# Patient Record
Sex: MALE | Race: WHITE | NOT HISPANIC OR LATINO | URBAN - METROPOLITAN AREA
[De-identification: names, ages, dates, MRNs, and addresses within clinical notes are randomized per-mention and may not be internally consistent; named-entity substitution may affect disease eponyms.]

---

## 2019-04-05 ENCOUNTER — EMERGENCY (EMERGENCY)
Facility: HOSPITAL | Age: 19
LOS: 1 days | Discharge: HOME | End: 2019-04-05
Admitting: EMERGENCY MEDICINE
Payer: COMMERCIAL

## 2019-04-05 VITALS
DIASTOLIC BLOOD PRESSURE: 51 MMHG | HEART RATE: 65 BPM | OXYGEN SATURATION: 100 % | TEMPERATURE: 98 F | SYSTOLIC BLOOD PRESSURE: 108 MMHG | RESPIRATION RATE: 16 BRPM

## 2019-04-05 PROCEDURE — 99283 EMERGENCY DEPT VISIT LOW MDM: CPT | Mod: 25

## 2019-04-05 RX ORDER — IBUPROFEN 200 MG
600 TABLET ORAL ONCE
Qty: 0 | Refills: 0 | Status: COMPLETED | OUTPATIENT
Start: 2019-04-05 | End: 2019-04-05

## 2019-04-05 RX ORDER — TRAMADOL HYDROCHLORIDE 50 MG/1
1 TABLET ORAL
Qty: 10 | Refills: 0 | OUTPATIENT
Start: 2019-04-05

## 2019-04-05 RX ORDER — TRAMADOL HYDROCHLORIDE 50 MG/1
50 TABLET ORAL ONCE
Qty: 0 | Refills: 0 | Status: DISCONTINUED | OUTPATIENT
Start: 2019-04-05 | End: 2019-04-05

## 2019-04-05 RX ORDER — IBUPROFEN 200 MG
1 TABLET ORAL
Qty: 20 | Refills: 0 | OUTPATIENT
Start: 2019-04-05

## 2019-04-05 RX ADMIN — TRAMADOL HYDROCHLORIDE 50 MILLIGRAM(S): 50 TABLET ORAL at 07:12

## 2019-04-05 RX ADMIN — Medication 600 MILLIGRAM(S): at 07:18

## 2019-04-05 RX ADMIN — TRAMADOL HYDROCHLORIDE 50 MILLIGRAM(S): 50 TABLET ORAL at 07:18

## 2019-04-05 RX ADMIN — Medication 600 MILLIGRAM(S): at 07:12

## 2019-04-05 NOTE — ED PROVIDER NOTE - NSFOLLOWUPINSTRUCTIONS_ED_ALL_ED_FT
Follow up with dental clinic provided on sheet.  Take antibiotic as prescribed.  take pain medication as needed.

## 2019-04-05 NOTE — ED PROVIDER NOTE - OBJECTIVE STATEMENT
17 y/o M c/o L upper tooth pain since last night.  He states the pain radiates along L lateral cheek and up towards the temple.  He reports recent cold with sinus congestion.  He denies fevers/chills, n/v, facial or dental trauma, gum swelling or drainage, history of recent dental issues.

## 2019-04-05 NOTE — ED PROVIDER NOTE - CLINICAL SUMMARY MEDICAL DECISION MAKING FREE TEXT BOX
19 y/o M presents to ED c/o L upper dental pain with concurrent URI.  Oral mucosa and teeth appear normal.  + dental pain to percussion.  DD: early dental infection vs sinus congestion with referred pain.  Will Rx Augmentin and Tramadol.  Pt referred to [facility] dental clinic.

## 2019-04-09 ENCOUNTER — EMERGENCY (EMERGENCY)
Facility: HOSPITAL | Age: 19
LOS: 1 days | Discharge: HOME | End: 2019-04-09
Attending: EMERGENCY MEDICINE | Admitting: EMERGENCY MEDICINE
Payer: COMMERCIAL

## 2019-04-09 VITALS
HEART RATE: 95 BPM | TEMPERATURE: 98 F | SYSTOLIC BLOOD PRESSURE: 127 MMHG | OXYGEN SATURATION: 96 % | DIASTOLIC BLOOD PRESSURE: 71 MMHG | RESPIRATION RATE: 18 BRPM

## 2019-04-09 DIAGNOSIS — K08.89 OTHER SPECIFIED DISORDERS OF TEETH AND SUPPORTING STRUCTURES: ICD-10-CM

## 2019-04-09 DIAGNOSIS — J34.89 OTHER SPECIFIED DISORDERS OF NOSE AND NASAL SINUSES: ICD-10-CM

## 2019-04-09 PROBLEM — Z78.9 OTHER SPECIFIED HEALTH STATUS: Chronic | Status: ACTIVE | Noted: 2019-04-05

## 2019-04-09 LAB — S PYO AG SPEC QL IA: NEGATIVE — SIGNIFICANT CHANGE UP

## 2019-04-09 PROCEDURE — 99283 EMERGENCY DEPT VISIT LOW MDM: CPT | Mod: 25

## 2019-04-09 RX ORDER — NYSTATIN 500MM UNIT
4 POWDER (EA) MISCELLANEOUS
Qty: 50 | Refills: 0 | OUTPATIENT
Start: 2019-04-09 | End: 2019-04-13

## 2019-04-09 NOTE — ED ADULT NURSE NOTE - NSIMPLEMENTINTERV_GEN_ALL_ED
Implemented All Universal Safety Interventions:  [place] to call system. Call bell, personal items and telephone within reach. Instruct patient to call for assistance. Room bathroom lighting operational. Non-slip footwear when patient is off stretcher. Physically safe environment: no spills, clutter or unnecessary equipment. Stretcher in lowest position, wheels locked, appropriate side rails in place.

## 2019-04-09 NOTE — ED PROVIDER NOTE - OBJECTIVE STATEMENT
Patient with no pmhx, was recently seen in ed for L upper molar pain, subsequently sent to [facility] dental where he was told he had no dental infection, presents today with diffuse tongue burning, and sensation of rash to tongue with dryness, and discomfort. notes some sore throat as well. denies cough, denies hx of tonsilar infections, denies hx of strep. denies rash. tolerating foods, denies trismus, or recent dental work. denies abx. went to [facility] prior and told to come to ED for evaluation. denies neck stiffness or headaches. denies recent uri or abx.

## 2019-04-09 NOTE — ED PROVIDER NOTE - CLINICAL SUMMARY MEDICAL DECISION MAKING FREE TEXT BOX
patient with scant thrush, will start nystatin oral wash, do rapid strep, and throat culture. do not suspect early bells, or zoster. patient is nontoxic appearing. was evaluated with [facility] and currently has no signs of symtpoms of dental abscess. protecting airway with no suspicion of retropharyngeal abscess, or fascitis.

## 2019-04-11 LAB
CULTURE RESULTS: SIGNIFICANT CHANGE UP
SPECIMEN SOURCE: SIGNIFICANT CHANGE UP

## 2019-04-13 DIAGNOSIS — B37.9 CANDIDIASIS, UNSPECIFIED: ICD-10-CM

## 2019-04-13 DIAGNOSIS — Z79.1 LONG TERM (CURRENT) USE OF NON-STEROIDAL ANTI-INFLAMMATORIES (NSAID): ICD-10-CM

## 2019-04-13 DIAGNOSIS — Z79.2 LONG TERM (CURRENT) USE OF ANTIBIOTICS: ICD-10-CM

## 2019-04-13 DIAGNOSIS — Z79.899 OTHER LONG TERM (CURRENT) DRUG THERAPY: ICD-10-CM

## 2019-04-13 DIAGNOSIS — R21 RASH AND OTHER NONSPECIFIC SKIN ERUPTION: ICD-10-CM

## 2020-11-21 NOTE — ED PROVIDER NOTE - NS_EDPROVIDERDISPOUSERTYPE_ED_A_ED
EKG, BP, O2 monitors applied as per protocol.    I have personally evaluated and examined the patient. The Attending was available to me as a supervising provider if needed.
